# Patient Record
Sex: FEMALE | Race: WHITE | NOT HISPANIC OR LATINO | Employment: FULL TIME | URBAN - METROPOLITAN AREA
[De-identification: names, ages, dates, MRNs, and addresses within clinical notes are randomized per-mention and may not be internally consistent; named-entity substitution may affect disease eponyms.]

---

## 2024-08-26 ENCOUNTER — APPOINTMENT (OUTPATIENT)
Dept: GASTROENTEROLOGY | Facility: CLINIC | Age: 44
End: 2024-08-26
Payer: COMMERCIAL

## 2024-08-26 VITALS
DIASTOLIC BLOOD PRESSURE: 87 MMHG | WEIGHT: 152 LBS | SYSTOLIC BLOOD PRESSURE: 136 MMHG | HEART RATE: 95 BPM | BODY MASS INDEX: 23.04 KG/M2 | HEIGHT: 68 IN

## 2024-08-26 DIAGNOSIS — R14.0 ABDOMINAL BLOATING: ICD-10-CM

## 2024-08-26 DIAGNOSIS — R10.9 ABDOMINAL PAIN, UNSPECIFIED ABDOMINAL LOCATION: Primary | ICD-10-CM

## 2024-08-26 DIAGNOSIS — Z11.59 ENCOUNTER FOR SCREENING FOR OTHER VIRAL DISEASES: ICD-10-CM

## 2024-08-26 DIAGNOSIS — K58.9 IRRITABLE BOWEL SYNDROME, UNSPECIFIED TYPE: ICD-10-CM

## 2024-08-26 DIAGNOSIS — R14.0 GASSINESS: ICD-10-CM

## 2024-08-26 PROCEDURE — 3008F BODY MASS INDEX DOCD: CPT | Performed by: STUDENT IN AN ORGANIZED HEALTH CARE EDUCATION/TRAINING PROGRAM

## 2024-08-26 PROCEDURE — 99205 OFFICE O/P NEW HI 60 MIN: CPT | Performed by: STUDENT IN AN ORGANIZED HEALTH CARE EDUCATION/TRAINING PROGRAM

## 2024-08-26 PROCEDURE — 1036F TOBACCO NON-USER: CPT | Performed by: STUDENT IN AN ORGANIZED HEALTH CARE EDUCATION/TRAINING PROGRAM

## 2024-08-26 RX ORDER — SENNOSIDES 8.6 MG/1
17.2 CAPSULE, GELATIN COATED ORAL NIGHTLY
Qty: 60 CAPSULE | Refills: 11 | Status: SHIPPED | OUTPATIENT
Start: 2024-08-26 | End: 2025-08-21

## 2024-08-26 RX ORDER — SODIUM CHLORIDE, SODIUM LACTATE, POTASSIUM CHLORIDE, CALCIUM CHLORIDE 600; 310; 30; 20 MG/100ML; MG/100ML; MG/100ML; MG/100ML
20 INJECTION, SOLUTION INTRAVENOUS CONTINUOUS
OUTPATIENT
Start: 2024-08-26

## 2024-08-26 RX ORDER — ONDANSETRON HYDROCHLORIDE 2 MG/ML
4 INJECTION, SOLUTION INTRAVENOUS ONCE AS NEEDED
OUTPATIENT
Start: 2024-08-26

## 2024-08-26 NOTE — PROGRESS NOTES
43-year-old lady with history of anxiety, sinusitis, works as [employer]  presenting with chronic history of abdominal cramping, pain, 4 hours, positive at night, associated with bloating gas distention abdominal rumbling, associated with bowel movements.  She mentions having previous history of constipation with 1 bowel movement every 3 days.  As of January 1, 2024 she changed her lifestyle and is following a healthy diet and is having 1 bowel movement per day, Dixie 4-5, she mentions having Dixie 7 around once a month and Dixie 1-2 few times a month.  She mentions intermittent heartburn around few times per week.    EGD never  Colonoscopy never  Family history reviewed, not pertinent to chief complaint  Bowel movements as above  Denies NSAIDs, previously drank 20-25 beers per week, currently drinking 10-12 beers a week over the weekend since January 2024, denies marijuana drug use smoking            The note was created using voice recognition transcription software. Despite proofreading, unintentional typographical errors may be present. Please contact the GI office with any questions or concerns.     Current Medications: reviewed    A 10 point review of system is negative except for what is mentioned in the HPI    Follow up with GI was advised       Vital Signs: Reviewed    Physical Exam:  General: no apparent distress, pleasant and cooperative  Skin:  Warm and dry, no jaundice  HEENT: No scleral icterus, no conjunctival pallor, normocephalic, atraumatic, mucous membranes moist  Neck:  atraumatic, trachea midline, no JVD  Chest:  decreased air entry to auscultation bilaterally. No wheezes, rales, or rhonchi  CV:  Regular rate and rhythm.  Positive S1/S2  Abdomen: no distension, +BS, soft, non-tender to palpation, no rebound tenderness, no guarding, no rigidity, no discernible ascites   Extremities: lower extremity edema, Chronic pigmentary changes, no cyanosis  Neurological:  A&Ox3 , no  asterixis  Psychiatric: cooperative     Investigations:  Labs, radiological imaging and cardiac work up were reviewed    1-screen for hepatitis C    2-BMI 23, healthy lifestyle advised    3-heartburn and abdominal discomfort described as above, likely multifactorial  *Possible component of IBS, start senna 2 tablets daily, Gas-X as needed, check KUB  *Will need to rule out peptic ulcer disease and celiac among others, check EGD, lifestyle changes advised, antireflux measures

## 2024-08-26 NOTE — PATIENT INSTRUCTIONS
1-for your abdominal pain please start taking senna 2 tablets daily, Gas-X over-the-counter 3 or 4 times a day, radiology scheduling will call you to schedule an x-ray  2-for heartburn and abdominal pain we will schedule for an upper endoscopy, please elevate the head of the bed 6 to 8 inches, have an early dinner at least 3 hours before sleep, have small frequent meals (5 small meals per day), avoid lying down after meals, avoid spices juices soda tomatoes bharat oranges caffeine, other caffeinated beverages, chocolate alcohol NSAIDs smoking fatty food peppermint among others

## 2024-10-16 ENCOUNTER — APPOINTMENT (OUTPATIENT)
Dept: GASTROENTEROLOGY | Facility: EXTERNAL LOCATION | Age: 44
End: 2024-10-16
Payer: COMMERCIAL

## 2024-10-16 ENCOUNTER — HOSPITAL ENCOUNTER (OUTPATIENT)
Dept: RADIOLOGY | Facility: CLINIC | Age: 44
Discharge: HOME | End: 2024-10-16
Payer: COMMERCIAL

## 2024-10-16 VITALS — WEIGHT: 152 LBS | HEIGHT: 68 IN | BODY MASS INDEX: 23.04 KG/M2

## 2024-10-16 DIAGNOSIS — Z12.31 SCREENING MAMMOGRAM FOR BREAST CANCER: ICD-10-CM

## 2024-10-16 PROCEDURE — 77063 BREAST TOMOSYNTHESIS BI: CPT | Performed by: RADIOLOGY

## 2024-10-16 PROCEDURE — 77067 SCR MAMMO BI INCL CAD: CPT

## 2024-10-16 PROCEDURE — 77067 SCR MAMMO BI INCL CAD: CPT | Performed by: RADIOLOGY

## 2025-06-13 ENCOUNTER — APPOINTMENT (OUTPATIENT)
Dept: PRIMARY CARE | Facility: CLINIC | Age: 45
End: 2025-06-13
Payer: COMMERCIAL

## 2025-07-06 ASSESSMENT — PROMIS GLOBAL HEALTH SCALE
EMOTIONAL_PROBLEMS: NEVER
RATE_MENTAL_HEALTH: EXCELLENT
CARRYOUT_SOCIAL_ACTIVITIES: EXCELLENT
RATE_GENERAL_HEALTH: EXCELLENT
RATE_QUALITY_OF_LIFE: EXCELLENT
RATE_SOCIAL_SATISFACTION: EXCELLENT
RATE_AVERAGE_PAIN: 0
RATE_PHYSICAL_HEALTH: EXCELLENT
CARRYOUT_PHYSICAL_ACTIVITIES: COMPLETELY

## 2025-07-07 ENCOUNTER — APPOINTMENT (OUTPATIENT)
Dept: PRIMARY CARE | Facility: CLINIC | Age: 45
End: 2025-07-07
Payer: COMMERCIAL

## 2025-07-07 VITALS
WEIGHT: 151 LBS | HEART RATE: 94 BPM | HEIGHT: 68 IN | DIASTOLIC BLOOD PRESSURE: 89 MMHG | OXYGEN SATURATION: 98 % | TEMPERATURE: 97.3 F | SYSTOLIC BLOOD PRESSURE: 137 MMHG | BODY MASS INDEX: 22.88 KG/M2

## 2025-07-07 DIAGNOSIS — Z13.220 SCREENING CHOLESTEROL LEVEL: ICD-10-CM

## 2025-07-07 DIAGNOSIS — Z12.11 SCREENING FOR COLON CANCER: ICD-10-CM

## 2025-07-07 DIAGNOSIS — D22.9 NUMEROUS SKIN MOLES: ICD-10-CM

## 2025-07-07 DIAGNOSIS — J30.2 SEASONAL ALLERGIES: ICD-10-CM

## 2025-07-07 DIAGNOSIS — Z13.0 SCREENING FOR DEFICIENCY ANEMIA: ICD-10-CM

## 2025-07-07 DIAGNOSIS — Z13.29 THYROID DISORDER SCREEN: ICD-10-CM

## 2025-07-07 DIAGNOSIS — L91.8 SKIN TAG: ICD-10-CM

## 2025-07-07 DIAGNOSIS — Z00.00 WELLNESS EXAMINATION: Primary | ICD-10-CM

## 2025-07-07 DIAGNOSIS — Z13.21 ENCOUNTER FOR VITAMIN DEFICIENCY SCREENING: ICD-10-CM

## 2025-07-07 DIAGNOSIS — Z13.1 DIABETES MELLITUS SCREENING: ICD-10-CM

## 2025-07-07 PROCEDURE — 99396 PREV VISIT EST AGE 40-64: CPT | Performed by: NURSE PRACTITIONER

## 2025-07-07 PROCEDURE — 1036F TOBACCO NON-USER: CPT | Performed by: NURSE PRACTITIONER

## 2025-07-07 PROCEDURE — 3008F BODY MASS INDEX DOCD: CPT | Performed by: NURSE PRACTITIONER

## 2025-07-07 RX ORDER — GUAIFENESIN 600 MG/1
600 TABLET, EXTENDED RELEASE ORAL 2 TIMES DAILY PRN
COMMUNITY

## 2025-07-07 RX ORDER — FEXOFENADINE HCL AND PSEUDOEPHEDRINE HCI 60; 120 MG/1; MG/1
1 TABLET, EXTENDED RELEASE ORAL 2 TIMES DAILY
COMMUNITY

## 2025-07-07 RX ORDER — FLUTICASONE PROPIONATE 50 MCG
1 SPRAY, SUSPENSION (ML) NASAL
COMMUNITY

## 2025-07-07 ASSESSMENT — ENCOUNTER SYMPTOMS
RESPIRATORY NEGATIVE: 1
EYES NEGATIVE: 1
MUSCULOSKELETAL NEGATIVE: 1
NEUROLOGICAL NEGATIVE: 1
ENDOCRINE NEGATIVE: 1
CONSTITUTIONAL NEGATIVE: 1
PSYCHIATRIC NEGATIVE: 1
DEPRESSION: 0
GASTROINTESTINAL NEGATIVE: 1
HEMATOLOGIC/LYMPHATIC NEGATIVE: 1
CARDIOVASCULAR NEGATIVE: 1

## 2025-07-07 ASSESSMENT — PAIN SCALES - GENERAL: PAINLEVEL_OUTOF10: 0-NO PAIN

## 2025-07-07 NOTE — PROGRESS NOTES
Subjective   Patient ID: Mary Julian is a 44 y.o. female who presents for Establish Care (Wellness check. Patient is fasting. Patient is concerned with hypertension.).    HPI     Patient presents to clinic to establish care.    Patient with past medical history of seasonal allergies.  She states she has allergy symptoms year-round.  She possibly has nasal congestion.  Has been using Flonase and decongestant without relief of symptoms.    Today patient presents with concerns about her blood pressure.  She states she has been checking her blood pressure at home has had some elevated readings.  In the mornings 120/70s in the evenings 130/95 has had some outliers 149/98.    Patient has been seen by cardiology in the past due strong family history of cardiac problems.  He had a calcium scoring test January 2023 calcium score 0.  She is without any complaints of any cardiac issues.       Patient has also been followed by gastroenterologist due to chronic constipation she states symptoms have improved.      Appetite normal bowel and bladder normal.    Preventive screening/immunizations  Care Gaps Reviewed addressed  Onfosrkjp-mw-xj-date due October 2025  Cologuard ordered today in clinic  Ophthalmology dental visits up-to-date  Immunizations reviewed Tdap not up-to-date according to records the patient states she believes she had it within the last 10 years but she will check her records.      Review of Systems   Constitutional: Negative.    HENT:  Positive for congestion (Chronic).    Eyes: Negative.    Respiratory: Negative.     Cardiovascular: Negative.    Gastrointestinal: Negative.    Endocrine: Negative.    Genitourinary: Negative.    Musculoskeletal: Negative.    Skin: Negative.    Neurological: Negative.    Hematological: Negative.    Psychiatric/Behavioral: Negative.         Objective   /89 (BP Location: Left arm, Patient Position: Sitting, BP Cuff Size: Adult)   Pulse 94   Temp 36.3 °C (97.3 °F)  "(Temporal)   Ht 1.727 m (5' 8\")   Wt 68.5 kg (151 lb)   SpO2 98%   BMI 22.96 kg/m²     Physical Exam  Vitals reviewed.   Constitutional:       General: She is not in acute distress.     Appearance: Normal appearance. She is not ill-appearing.   HENT:      Right Ear: Tympanic membrane and ear canal normal.      Left Ear: Tympanic membrane and ear canal normal.      Nose:      Right Turbinates: Not enlarged.      Left Turbinates: Not enlarged.      Mouth/Throat:      Mouth: Mucous membranes are moist.      Pharynx: Oropharynx is clear.   Eyes:      Conjunctiva/sclera: Conjunctivae normal.      Pupils: Pupils are equal, round, and reactive to light.   Cardiovascular:      Rate and Rhythm: Normal rate and regular rhythm.   Pulmonary:      Effort: Pulmonary effort is normal.      Breath sounds: Normal breath sounds.   Abdominal:      General: Bowel sounds are normal.      Palpations: Abdomen is soft.   Musculoskeletal:         General: Normal range of motion.      Cervical back: Normal range of motion and neck supple.   Skin:     General: Skin is warm and dry.      Comments: Multiple flesh tone skin moles noted face and torso   Neurological:      General: No focal deficit present.      Mental Status: She is alert and oriented to person, place, and time.   Psychiatric:         Mood and Affect: Mood normal.         Thought Content: Thought content normal.         Assessment/Plan   Diagnoses and all orders for this visit:  Wellness examination  - Unremarkable physical exam   -Labs drawn today in clinic  -Cologuard ordered   - Referrals for dermatology and allergy submitted.    Seasonal allergies  -     Referral to Allergy; Future  Numerous skin moles  -     Referral to Dermatology  Screening for deficiency anemia  -     CBC and Auto Differential  Diabetes mellitus screening  -     Comprehensive Metabolic Panel  -     Hemoglobin A1C  Screening cholesterol level  -     Lipid Panel  Thyroid disorder screen  -     TSH with " reflex to Free T4 if abnormal  Encounter for vitamin deficiency screening  -     Vitamin D 25-Hydroxy,Total (for eval of Vitamin D levels)  Screening for colon cancer  -     Cologuard® colon cancer screening; Future  Skin tag  -     Referral to Dermatology    Home blood pressure checks  Follow DASH diet

## 2025-07-07 NOTE — PATIENT INSTRUCTIONS
Good Seeing you today.  Continue medications as prescribed.  Healthy diet and Drink plenty of water.  Regular exercises.  Labs drawn today in clinic.  Please see [facility] for results.  Will notify you with abnormal results only by telephone or [facility].  If you have any question about your labs do not hesitate to give our office a call.    If you do not have access to [facility] our office will notify you with any abnormal results.  Please schedule for ANY REFERRALS SUBMITTED. If you have problems scheduling please notify our office.    Please schedule any appointments with ophthalmology/dentist if you are not up-to-date.    Follow-up in 1 year sooner if needed.  Continue to Check Blood pressures at home and follow Dash diet.   Call office any new concerns.

## 2025-07-08 LAB
25(OH)D3+25(OH)D2 SERPL-MCNC: 25 NG/ML (ref 30–100)
ALBUMIN SERPL-MCNC: 4.7 G/DL (ref 3.6–5.1)
ALP SERPL-CCNC: 48 U/L (ref 31–125)
ALT SERPL-CCNC: 9 U/L (ref 6–29)
ANION GAP SERPL CALCULATED.4IONS-SCNC: 8 MMOL/L (CALC) (ref 7–17)
AST SERPL-CCNC: 12 U/L (ref 10–30)
BASOPHILS # BLD AUTO: 20 CELLS/UL (ref 0–200)
BASOPHILS NFR BLD AUTO: 0.4 %
BILIRUB SERPL-MCNC: 0.6 MG/DL (ref 0.2–1.2)
BUN SERPL-MCNC: 10 MG/DL (ref 7–25)
CALCIUM SERPL-MCNC: 10.1 MG/DL (ref 8.6–10.2)
CHLORIDE SERPL-SCNC: 104 MMOL/L (ref 98–110)
CHOLEST SERPL-MCNC: 212 MG/DL
CHOLEST/HDLC SERPL: 2.7 (CALC)
CO2 SERPL-SCNC: 25 MMOL/L (ref 20–32)
CREAT SERPL-MCNC: 0.87 MG/DL (ref 0.5–0.99)
EGFRCR SERPLBLD CKD-EPI 2021: 84 ML/MIN/1.73M2
EOSINOPHIL # BLD AUTO: 430 CELLS/UL (ref 15–500)
EOSINOPHIL NFR BLD AUTO: 8.6 %
ERYTHROCYTE [DISTWIDTH] IN BLOOD BY AUTOMATED COUNT: 12.3 % (ref 11–15)
EST. AVERAGE GLUCOSE BLD GHB EST-MCNC: 103 MG/DL
EST. AVERAGE GLUCOSE BLD GHB EST-SCNC: 5.7 MMOL/L
GLUCOSE SERPL-MCNC: 88 MG/DL (ref 65–99)
HBA1C MFR BLD: 5.2 %
HCT VFR BLD AUTO: 42.8 % (ref 35–45)
HDLC SERPL-MCNC: 79 MG/DL
HGB BLD-MCNC: 14.2 G/DL (ref 11.7–15.5)
LDLC SERPL CALC-MCNC: 113 MG/DL (CALC)
LYMPHOCYTES # BLD AUTO: 1285 CELLS/UL (ref 850–3900)
LYMPHOCYTES NFR BLD AUTO: 25.7 %
MCH RBC QN AUTO: 31.8 PG (ref 27–33)
MCHC RBC AUTO-ENTMCNC: 33.2 G/DL (ref 32–36)
MCV RBC AUTO: 96 FL (ref 80–100)
MONOCYTES # BLD AUTO: 530 CELLS/UL (ref 200–950)
MONOCYTES NFR BLD AUTO: 10.6 %
NEUTROPHILS # BLD AUTO: 2735 CELLS/UL (ref 1500–7800)
NEUTROPHILS NFR BLD AUTO: 54.7 %
NONHDLC SERPL-MCNC: 133 MG/DL (CALC)
PLATELET # BLD AUTO: 286 THOUSAND/UL (ref 140–400)
PMV BLD REES-ECKER: 9.8 FL (ref 7.5–12.5)
POTASSIUM SERPL-SCNC: 5 MMOL/L (ref 3.5–5.3)
PROT SERPL-MCNC: 7.4 G/DL (ref 6.1–8.1)
RBC # BLD AUTO: 4.46 MILLION/UL (ref 3.8–5.1)
SODIUM SERPL-SCNC: 137 MMOL/L (ref 135–146)
TRIGL SERPL-MCNC: 96 MG/DL
TSH SERPL-ACNC: 1.46 MIU/L
WBC # BLD AUTO: 5 THOUSAND/UL (ref 3.8–10.8)

## 2025-07-24 ENCOUNTER — APPOINTMENT (OUTPATIENT)
Dept: ALLERGY | Facility: CLINIC | Age: 45
End: 2025-07-24
Payer: COMMERCIAL

## 2025-07-24 VITALS
HEART RATE: 106 BPM | DIASTOLIC BLOOD PRESSURE: 97 MMHG | BODY MASS INDEX: 22.13 KG/M2 | TEMPERATURE: 98.4 F | WEIGHT: 146 LBS | SYSTOLIC BLOOD PRESSURE: 144 MMHG | HEIGHT: 68 IN

## 2025-07-24 DIAGNOSIS — J30.81 ALLERGIC RHINITIS DUE TO ANIMAL HAIR AND DANDER: ICD-10-CM

## 2025-07-24 DIAGNOSIS — J30.89 ALLERGIC RHINITIS DUE TO DUST MITE: ICD-10-CM

## 2025-07-24 DIAGNOSIS — J30.1 SEASONAL ALLERGIC RHINITIS DUE TO POLLEN: ICD-10-CM

## 2025-07-24 DIAGNOSIS — J34.89 SINUS PRESSURE: Primary | ICD-10-CM

## 2025-07-24 PROCEDURE — 95004 PERQ TESTS W/ALRGNC XTRCS: CPT | Performed by: ALLERGY & IMMUNOLOGY

## 2025-07-24 PROCEDURE — 99204 OFFICE O/P NEW MOD 45 MIN: CPT | Performed by: ALLERGY & IMMUNOLOGY

## 2025-07-24 RX ORDER — OLOPATADINE HYDROCHLORIDE 2 MG/ML
1 SOLUTION OPHTHALMIC DAILY PRN
Qty: 2.5 ML | Refills: 5 | Status: SHIPPED | OUTPATIENT
Start: 2025-07-24 | End: 2026-07-24

## 2025-07-24 RX ORDER — FEXOFENADINE HCL 180 MG/1
180 TABLET ORAL DAILY PRN
Qty: 30 TABLET | Refills: 11 | Status: SHIPPED | OUTPATIENT
Start: 2025-07-24 | End: 2026-07-24

## 2025-07-24 RX ORDER — FLUTICASONE PROPIONATE 93 UG/1
1 SPRAY, METERED NASAL 2 TIMES DAILY
Qty: 16 ML | Refills: 11 | Status: SHIPPED | OUTPATIENT
Start: 2025-07-24

## 2025-07-24 ASSESSMENT — ENCOUNTER SYMPTOMS
RESPIRATORY NEGATIVE: 1
CARDIOVASCULAR NEGATIVE: 1
EYES NEGATIVE: 1
GASTROINTESTINAL NEGATIVE: 1
DIZZINESS: 1
MUSCULOSKELETAL NEGATIVE: 1
ALLERGIC/IMMUNOLOGIC NEGATIVE: 1
HEADACHES: 1
CONSTITUTIONAL NEGATIVE: 1
HEMATOLOGIC/LYMPHATIC NEGATIVE: 1

## 2025-07-24 NOTE — PATIENT INSTRUCTIONS
It was nice to meet you today     Your environmental allergy testing today was positive to tree, grass, weed pollen, cat, dust mite.  Please see below for environmental allergen avoidance recommendations.     Start Xhance 1 puff each nostril twice daily (sample provided)     Continue Allegra (Fexofenadine) 180 mg once daily as needed     You may use olopatadine 0.2% eyedrops 1 drop each eye daily as needed    We have referred you to ENT for evaluation of your sinuses     We would like to see you in follow up in 3 months or sooner if needed    Our nurse line phone number is [phone] if you have questions or concerns     Environmental Allergy Testing:  Test Results (wheal/flare in mm)    Controls  Controls  Histamine: 10/35  Negative: 0    Trees:  Trees  American Beech:   Curtis: 0  Birch:   Black [place]: 0  [place]:   [place]: 0  [place]: 0  Elm: 0  [place]: 0  Maple: 0  [place]: 0  [place]:   [place]: 5/10  White poplar: 0     Grasses:  Grass  Bahia: 0  [place]: 0  Jean-Paul: 0  KORT Grass Mix: 5/15  Sweet Vernal: 0    Weeds:  Weeds  Cocklebur:   Dandelion: 0  English Plantain: 0  Goldenrod: 0/10  Lambs Quarters: 0  Mugwort: 0  Pigweed: 0  Ragweed Mix: 0  Russian Thistle:   Yellow Dock: 0     Molds:  Molds  Alternaria: 0  Aspergillus: 0  Cladosporium: 0  Helminthosporium: 0  Penicillium: 0  Stemphyllium: 0    Animals/Dust Mite:  Animals  Cat:   AP do  Colon Do  Mouse: 0  Cockroach: 0  Dust Mite F:   Dust Mite P:      Thank you for your visit to the [facility].  Part of a successful visit is taking home the information you learned today and using it to make things better.  These take home instructions are to help you, if you have questions or concerns, please contact us.     Please see below for recommendations on environmental allergy control or modification:     Pollen Avoidance--If you are sensitive  to pollen, there are a few tips to help limit, but not avoid, exposure.     Minimize outdoor activity between 5am-10am, pollen levels are highest at this time.       Keep car windows closed when traveling.     Close house windows at night, use the air conditioning if necessary.     Check the pollen count to know what pollen is outside (http://aaaai.org/nab).       , Pet Avoidance     Keep pets out of the bedroom at all times.     Keep pets off of furniture and other surfaces like counter tops.     More frequent bathing can help      Groom your pet outside so hair and dander stay outside the home when brushed.     Consider using HEPA air purifier in bedroom    , and Dust mite control.            1.  Dust mite proof covers/encasing on the mattress, pillow and box spring.            2.  Wash sheets and bed linens in hot water each week.          3.  Vacuum carpets in bedroom each week.          4.  Dust or wipe down any hard surfaces.          5.  Avoid using a humidifier in the bedroom      Limit Cigarette smoke exposure.  Smoking and second hand smoke can irritate the nose and sinuses.  You and the people around you will benefit from avoiding cigarette smoke.

## 2025-07-24 NOTE — PROGRESS NOTES
Mary Julian presents for initial evaluation today.      Mary Julian was seen at the request of Trixie Sotomayor* for a chief complaint of sinus pressure, headache; a report with my findings is being sent via written or electronic means to Trixie Sotomayor* with my recommendations for treatment    She provides the following history:    She notes that for the past 6 months she has had constant headache and pressure behind her eyes as well as sinus pressure and inner ear pressure/ear popping.  She feels like she has vertigo at times.  She saw her eye doctor who felt that her eyes were not the cause of her symptoms.  She has tried Mucinex 12-hour and Allegra for symptoms.  She has also tried Flonase which did not improve symptoms.  She feels that Sudafed helps, but tries to use it sparingly.    Prior to this past 6 months, she had a history of itchy eyes, runny nose, sneezing, nasal congestion occurring all year-round and worse with cat exposure.  She notes improvement in these symptoms after she got a HEPA filter in her bedroom.  Her current sinus symptoms are different than her prior allergy symptoms.    She does not have pets at home, however her daughter brings her cats when she visits on occasion.    Asthma:  Denies     Eczema: Denies    Food allergy: Denies but kiwi causes oral irritation and nausea     Venom allergy:  Denies     Drug allergy:  Penicillin rash as infant.      Environmental History:  Type of home:  House  Pets in the house: None  Mold or moisture in the home: None  Bedroom maria del carmen: Carpet  Dust mite covers on bed:  No  Cigarette exposure in the home:  No  Occupation/School: , works in office built in 1990    Pertinent Allergy/Immunology family history:  Sister with eczema     Review of Systems   Constitutional: Negative.    HENT:  Positive for congestion.    Eyes: Negative.    Respiratory: Negative.     Cardiovascular: Negative.    Gastrointestinal: Negative.   "  Musculoskeletal: Negative.    Skin: Negative.    Allergic/Immunologic: Negative.    Neurological:  Positive for dizziness and headaches.   Hematological: Negative.        Vital signs:  BP (!) 144/97 (BP Location: Right arm, Patient Position: Standing, BP Cuff Size: Adult) Comment: patient experiences white coat syndrome  Pulse 106   Temp 36.9 °C (98.4 °F)   Ht 1.727 m (5' 8\")   Wt 66.2 kg (146 lb)   BMI 22.20 kg/m²     Physical Exam:  GENERAL: Alert, oriented and in no acute distress.     HEENT: EYES: No conjunctival injection or cobblestoning. Nose: nasal turbinates are not edematous and are not boggy.  There is no mucous stranding, polyps, or blood noted. EARS: Tympanic membranes are clear. MOUTH: moist and pink with no exudates, ulcers, or thrush. NECK: No upper airway stridor noted.       HEART: regular rate and rhythm.       LUNGS: Clear to auscultation bilaterally. No wheezing, rhonchi or rales.        ABDOMEN: Positive bowel sounds, soft, nontender, nondistended.       EXTREMITIES: No clubbing or edema.        NEURO:  Normal affect.  Gait normal.      SKIN: No rash, hives, or angioedema noted    Environmental Allergy Testing:  Test Results (wheal/flare in mm)    Controls  Controls  Histamine: 10/35  Negative: 0    Trees:  Trees  American Beech:   Curtis: 0  Birch:   Black [place]: 0  [place]:   [place]: 0  Eastern [place]: 0  Elm: 0  [place]: 0  Maple: 0  [place]: 0  [place]:   [place]: 5/10  White poplar: 0     Grasses:  Grass  Bahia: 0  [place]: 0  Jean-Paul: 0  KORT Grass Mix: 5/15  Sweet Vernal: 0    Weeds:  Weeds  Cocklebur:   Dandelion: 0  English Plantain: 0  Goldenrod: 0/10  Lambs Quarters: 0  Mugwort: 0  Pigweed: 0  Ragweed Mix: 0  Russian Thistle:   Yellow Dock: 0     Molds:  Molds  Alternaria: 0  Aspergillus: 0  Cladosporium: 0  Helminthosporium: 0  Penicillium: 0  Stemphyllium: 0    Animals/Dust Mite:  Animals  Cat:   AP do  Colon Do  Mouse: 0  Cockroach: 0  Dust " Mite F: 4/20  Dust Mite P: 5/7       Impression:  1. Sinus pressure    2. Allergic rhinitis due to dust mite    3. Seasonal allergic rhinitis due to pollen    4. Allergic rhinitis due to animal hair and dander      Assessment and Plan:  Allergic rhinitis, seasonal and perennial; sinus pressure: Mary was found to have allergic sensitivity to tree, grass, weed pollen, dust mite, cat on aeroallergen skin prick testing today.  We discussed treatments for allergic rhinitis to include avoidance, medication, and allergen immunotherapy.  We discussed interventions for dust mite control, and provided  information on dust mite encasements for the bedding.  Recommend starting Xhance 1 spray each nostril twice daily and may use fexofenadine 180 mg daily as needed.  We reviewed proper nasal spray administration technique.  If she does not achieve adequate control with medication and/or would like to decrease overall medication use, allergen immunotherapy would be an option to consider in the future.    Suspect that symptoms may not be fully allergy driven (could be an exacerbating factor rather than primary cause) as current symptoms started outside of the allergy pollen season.  Have referred her to ENT for sinus evaluation and to determine if sinus structural abnormalities could be playing a role.  We discussed that it is also possible that symptoms could be a form of neurologic headache.    She will follow-up after ENT evaluation

## 2025-07-24 NOTE — LETTER
July 25, 2025     RADHA Barnes-JAKI  [address]  [place] [facility] [address]    Patient: Mary Julian   YOB: 1980   Date of Visit: 7/24/2025       Dear RADHA Luna-JAKI:    Thank you for referring Mary Julian to me for evaluation. Below are my notes for this consultation.  If you have questions, please do not hesitate to call me. I look forward to following your patient along with you.       Sincerely,     Princess OMAR Muniz MD      CC: No Recipients  ______________________________________________________________________________________    Mary Julian presents for initial evaluation today.      Mary Julian was seen at the request of Tato Sotomayor for a chief complaint of sinus pressure, headache; a report with my findings is being sent via written or electronic means to Tato Sotomayor with my recommendations for treatment    She provides the following history:    She notes that for the past 6 months she has had constant headache and pressure behind her eyes as well as sinus pressure and inner ear pressure/ear popping.  She feels like she has vertigo at times.  She saw her eye doctor who felt that her eyes were not the cause of her symptoms.  She has tried Mucinex 12-hour and Allegra for symptoms.  She has also tried Flonase which did not improve symptoms.  She feels that Sudafed helps, but tries to use it sparingly.    Prior to this past 6 months, she had a history of itchy eyes, runny nose, sneezing, nasal congestion occurring all year-round and worse with cat exposure.  She notes improvement in these symptoms after she got a HEPA filter in her bedroom.  Her current sinus symptoms are different than her prior allergy symptoms.    She does not have pets at home, however her daughter brings her cats when she visits on occasion.    Asthma:  Denies     Eczema: Denies    Food allergy: Denies but kiwi causes oral irritation and  "nausea     Venom allergy:  Denies     Drug allergy:  Penicillin rash as infant.      Environmental History:  Type of home:  House  Pets in the house: None  Mold or moisture in the home: None  Bedroom maria del carmen: Carpet  Dust mite covers on bed:  No  Cigarette exposure in the home:  No  Occupation/School: , works in office built in 1990    Pertinent Allergy/Immunology family history:  Sister with eczema     Review of Systems   Constitutional: Negative.    HENT:  Positive for congestion.    Eyes: Negative.    Respiratory: Negative.     Cardiovascular: Negative.    Gastrointestinal: Negative.    Musculoskeletal: Negative.    Skin: Negative.    Allergic/Immunologic: Negative.    Neurological:  Positive for dizziness and headaches.   Hematological: Negative.        Vital signs:  BP (!) 144/97 (BP Location: Right arm, Patient Position: Standing, BP Cuff Size: Adult) Comment: patient experiences white coat syndrome  Pulse 106   Temp 36.9 °C (98.4 °F)   Ht 1.727 m (5' 8\")   Wt 66.2 kg (146 lb)   BMI 22.20 kg/m²     Physical Exam:  GENERAL: Alert, oriented and in no acute distress.     HEENT: EYES: No conjunctival injection or cobblestoning. Nose: nasal turbinates are not edematous and are not boggy.  There is no mucous stranding, polyps, or blood noted. EARS: Tympanic membranes are clear. MOUTH: moist and pink with no exudates, ulcers, or thrush. NECK: No upper airway stridor noted.       HEART: regular rate and rhythm.       LUNGS: Clear to auscultation bilaterally. No wheezing, rhonchi or rales.        ABDOMEN: Positive bowel sounds, soft, nontender, nondistended.       EXTREMITIES: No clubbing or edema.        NEURO:  Normal affect.  Gait normal.      SKIN: No rash, hives, or angioedema noted    Environmental Allergy Testing:  Test Results (wheal/flare in mm)    Controls  Controls  Histamine: 10/35  Negative: 0    Trees:  Trees  American Beech: 5/20  Curtis: 0  Birch: 8/25  Black [place]: 0  [place]: "   [place]: 0  Eastern [place]: 0  Elm: 0  [place]: 0  Maple: 0  [place]: 0  [place]:   [place]: 5/10  White poplar: 0     Grasses:  Grass  Bahia: 0  [place]: 0  Jean-Paul: 0  KORT Grass Mix: 5/15  Sweet Vernal: 0    Weeds:  Weeds  Cocklebur:   Dandelion: 0  English Plantain: 0  Goldenrod: 0/10  Lambs Quarters: 0  Mugwort: 0  Pigweed: 0  Ragweed Mix: 0  Russian Thistle:   Yellow Dock: 0     Molds:  Molds  Alternaria: 0  Aspergillus: 0  Cladosporium: 0  Helminthosporium: 0  Penicillium: 0  Stemphyllium: 0    Animals/Dust Mite:  Animals  Cat:   AP do  Colon Do  Mouse: 0  Cockroach: 0  Dust Mite F:   Dust Mite P:        Impression:  1. Sinus pressure    2. Allergic rhinitis due to dust mite    3. Seasonal allergic rhinitis due to pollen    4. Allergic rhinitis due to animal hair and dander      Assessment and Plan:  Allergic rhinitis, seasonal and perennial; sinus pressure: Mary was found to have allergic sensitivity to tree, grass, weed pollen, dust mite, cat on aeroallergen skin prick testing today.  We discussed treatments for allergic rhinitis to include avoidance, medication, and allergen immunotherapy.  We discussed interventions for dust mite control, and provided  information on dust mite encasements for the bedding.  Recommend starting Xhance 1 spray each nostril twice daily and may use fexofenadine 180 mg daily as needed.  We reviewed proper nasal spray administration technique.  If she does not achieve adequate control with medication and/or would like to decrease overall medication use, allergen immunotherapy would be an option to consider in the future.    Suspect that symptoms may not be fully allergy driven (could be an exacerbating factor rather than primary cause) as current symptoms started outside of the allergy pollen season.  Have referred her to ENT for sinus evaluation and to determine if sinus structural abnormalities could be playing a role.  We discussed that it is  also possible that symptoms could be a form of neurologic headache.    She will follow-up after ENT evaluation

## 2025-09-16 ENCOUNTER — APPOINTMENT (OUTPATIENT)
Dept: OTOLARYNGOLOGY | Facility: CLINIC | Age: 45
End: 2025-09-16
Payer: COMMERCIAL

## 2025-12-10 ENCOUNTER — APPOINTMENT (OUTPATIENT)
Dept: ALLERGY | Facility: CLINIC | Age: 45
End: 2025-12-10
Payer: COMMERCIAL

## 2026-01-06 ENCOUNTER — APPOINTMENT (OUTPATIENT)
Dept: DERMATOLOGY | Facility: CLINIC | Age: 46
End: 2026-01-06
Payer: COMMERCIAL

## 2026-01-08 ENCOUNTER — APPOINTMENT (OUTPATIENT)
Dept: DERMATOLOGY | Facility: CLINIC | Age: 46
End: 2026-01-08
Payer: COMMERCIAL